# Patient Record
Sex: FEMALE | Race: WHITE
[De-identification: names, ages, dates, MRNs, and addresses within clinical notes are randomized per-mention and may not be internally consistent; named-entity substitution may affect disease eponyms.]

---

## 2018-08-01 ENCOUNTER — HOSPITAL ENCOUNTER (EMERGENCY)
Dept: HOSPITAL 12 - ER | Age: 73
LOS: 1 days | Discharge: LEFT BEFORE DISCHARGE | End: 2018-08-02
Payer: MEDICARE

## 2018-08-01 VITALS — BODY MASS INDEX: 23.37 KG/M2 | HEIGHT: 62 IN | WEIGHT: 127 LBS

## 2018-08-01 DIAGNOSIS — E03.9: ICD-10-CM
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
## 2018-08-01 DIAGNOSIS — E86.0: ICD-10-CM
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
## 2018-08-01 DIAGNOSIS — F10.10: ICD-10-CM
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
## 2018-08-01 DIAGNOSIS — R55: ICD-10-CM

## 2018-08-01 DIAGNOSIS — Z88.5: ICD-10-CM

## 2018-08-01 DIAGNOSIS — E78.5: ICD-10-CM
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
## 2018-08-01 DIAGNOSIS — S00.83XA: Primary | ICD-10-CM

## 2018-08-01 LAB
BASOPHILS # BLD AUTO: 0.1 K/UL (ref 0–8)
BASOPHILS NFR BLD AUTO: 0.9 % (ref 0–2)
EOSINOPHIL # BLD AUTO: 0.1 K/UL (ref 0–0.7)
EOSINOPHIL NFR BLD AUTO: 1.4 % (ref 0–7)
HCT VFR BLD AUTO: 37.8 % (ref 31.2–41.9)
HGB BLD-MCNC: 12.7 G/DL (ref 10.9–14.3)
LYMPHOCYTES # BLD AUTO: 2.9 K/UL (ref 20–40)
LYMPHOCYTES NFR BLD AUTO: 43.4 % (ref 20.5–51.5)
MCH RBC QN AUTO: 30.5 UUG (ref 24.7–32.8)
MCHC RBC AUTO-ENTMCNC: 34 G/DL (ref 32.3–35.6)
MCV RBC AUTO: 90.6 FL (ref 75.5–95.3)
MONOCYTES # BLD AUTO: 0.3 K/UL (ref 2–10)
MONOCYTES NFR BLD AUTO: 4.5 % (ref 0–11)
NEUTROPHILS # BLD AUTO: 3.4 K/UL (ref 1.8–8.9)
NEUTROPHILS NFR BLD AUTO: 49.8 % (ref 38.5–71.5)
PLATELET # BLD AUTO: 261 K/UL (ref 179–408)
RBC # BLD AUTO: 4.17 MIL/UL (ref 3.63–4.92)
WBC # BLD AUTO: 6.8 K/UL (ref 3.8–11.8)

## 2018-08-01 PROCEDURE — A4663 DIALYSIS BLOOD PRESSURE CUFF: HCPCS

## 2018-08-01 PROCEDURE — G0480 DRUG TEST DEF 1-7 CLASSES: HCPCS
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
## 2018-08-02 VITALS — SYSTOLIC BLOOD PRESSURE: 98 MMHG | DIASTOLIC BLOOD PRESSURE: 63 MMHG

## 2018-08-02 LAB
BUN SERPL-MCNC: 26 MG/DL (ref 7–18)
CHLORIDE SERPL-SCNC: 103 MMOL/L (ref 98–107)
CO2 SERPL-SCNC: 24 MMOL/L (ref 21–32)
CREAT SERPL-MCNC: 1.1 MG/DL (ref 0.6–1.3)
ETHANOL SERPL-MCNC: 62 MG/DL (ref 0–0)
GLUCOSE SERPL-MCNC: 97 MG/DL (ref 74–106)
POTASSIUM SERPL-SCNC: 3.6 MMOL/L (ref 3.5–5.1)

## 2018-08-02 NOTE — NUR
Patient does not wish to proceed with medical care recommended by Dr. Bauer.  
Patient given information related to possible complications, up to and 
including death, which could occur as a result of leaving the hospital at this 
time.  Patient verbalizes understanding of risks involved due to leaving 
against medical advice.  Patient has signed AMA form.